# Patient Record
Sex: MALE | URBAN - METROPOLITAN AREA
[De-identification: names, ages, dates, MRNs, and addresses within clinical notes are randomized per-mention and may not be internally consistent; named-entity substitution may affect disease eponyms.]

---

## 2024-11-22 ENCOUNTER — LAB VISIT (OUTPATIENT)
Dept: LAB | Facility: HOSPITAL | Age: 54
End: 2024-11-22
Payer: COMMERCIAL

## 2024-11-22 ENCOUNTER — OFFICE VISIT (OUTPATIENT)
Facility: CLINIC | Age: 54
End: 2024-11-22
Payer: COMMERCIAL

## 2024-11-22 VITALS
BODY MASS INDEX: 21.73 KG/M2 | OXYGEN SATURATION: 96 % | WEIGHT: 118.06 LBS | RESPIRATION RATE: 18 BRPM | HEIGHT: 62 IN | TEMPERATURE: 98 F | HEART RATE: 92 BPM | DIASTOLIC BLOOD PRESSURE: 60 MMHG | SYSTOLIC BLOOD PRESSURE: 100 MMHG

## 2024-11-22 DIAGNOSIS — Z00.00 ENCOUNTER FOR ANNUAL PHYSICAL EXAM: ICD-10-CM

## 2024-11-22 DIAGNOSIS — Z11.3 SCREENING FOR STD (SEXUALLY TRANSMITTED DISEASE): ICD-10-CM

## 2024-11-22 DIAGNOSIS — Z11.59 NEED FOR HEPATITIS C SCREENING TEST: ICD-10-CM

## 2024-11-22 DIAGNOSIS — R82.90 ABNORMAL URINE ODOR: ICD-10-CM

## 2024-11-22 DIAGNOSIS — Z00.00 ENCOUNTER FOR ANNUAL PHYSICAL EXAM: Primary | ICD-10-CM

## 2024-11-22 DIAGNOSIS — Z11.4 ENCOUNTER FOR SCREENING FOR HUMAN IMMUNODEFICIENCY VIRUS (HIV): ICD-10-CM

## 2024-11-22 DIAGNOSIS — Z12.11 ENCOUNTER FOR COLORECTAL CANCER SCREENING: ICD-10-CM

## 2024-11-22 DIAGNOSIS — Z12.12 ENCOUNTER FOR COLORECTAL CANCER SCREENING: ICD-10-CM

## 2024-11-22 DIAGNOSIS — R82.90 ABNORMAL URINE ODOR: Primary | ICD-10-CM

## 2024-11-22 LAB
ALBUMIN SERPL BCP-MCNC: 3.9 G/DL (ref 3.5–5.2)
ALP SERPL-CCNC: 78 U/L (ref 40–150)
ALT SERPL W/O P-5'-P-CCNC: 18 U/L (ref 10–44)
ANION GAP SERPL CALC-SCNC: 11 MMOL/L (ref 8–16)
AST SERPL-CCNC: 26 U/L (ref 10–40)
BASOPHILS # BLD AUTO: 0.06 K/UL (ref 0–0.2)
BASOPHILS NFR BLD: 0.8 % (ref 0–1.9)
BILIRUB SERPL-MCNC: 0.9 MG/DL (ref 0.1–1)
BILIRUB UR QL STRIP: NEGATIVE
BUN SERPL-MCNC: 11 MG/DL (ref 6–20)
CALCIUM SERPL-MCNC: 9.4 MG/DL (ref 8.7–10.5)
CHLORIDE SERPL-SCNC: 106 MMOL/L (ref 95–110)
CHOLEST SERPL-MCNC: 182 MG/DL (ref 120–199)
CHOLEST/HDLC SERPL: 2 {RATIO} (ref 2–5)
CLARITY UR: CLEAR
CO2 SERPL-SCNC: 24 MMOL/L (ref 23–29)
COLOR UR: YELLOW
CREAT SERPL-MCNC: 0.7 MG/DL (ref 0.5–1.4)
DIFFERENTIAL METHOD BLD: ABNORMAL
EOSINOPHIL # BLD AUTO: 0.7 K/UL (ref 0–0.5)
EOSINOPHIL NFR BLD: 9.1 % (ref 0–8)
ERYTHROCYTE [DISTWIDTH] IN BLOOD BY AUTOMATED COUNT: 11.7 % (ref 11.5–14.5)
EST. GFR  (NO RACE VARIABLE): >60 ML/MIN/1.73 M^2
GLUCOSE SERPL-MCNC: 77 MG/DL (ref 70–110)
GLUCOSE UR QL STRIP: NEGATIVE
HCT VFR BLD AUTO: 42.9 % (ref 40–54)
HDLC SERPL-MCNC: 93 MG/DL (ref 40–75)
HDLC SERPL: 51.1 % (ref 20–50)
HGB BLD-MCNC: 14.3 G/DL (ref 14–18)
HGB UR QL STRIP: NEGATIVE
IMM GRANULOCYTES # BLD AUTO: 0.03 K/UL (ref 0–0.04)
IMM GRANULOCYTES NFR BLD AUTO: 0.4 % (ref 0–0.5)
KETONES UR QL STRIP: ABNORMAL
LDLC SERPL CALC-MCNC: 74.8 MG/DL (ref 63–159)
LEUKOCYTE ESTERASE UR QL STRIP: NEGATIVE
LYMPHOCYTES # BLD AUTO: 1.6 K/UL (ref 1–4.8)
LYMPHOCYTES NFR BLD: 21.6 % (ref 18–48)
MCH RBC QN AUTO: 32.7 PG (ref 27–31)
MCHC RBC AUTO-ENTMCNC: 33.3 G/DL (ref 32–36)
MCV RBC AUTO: 98 FL (ref 82–98)
MONOCYTES # BLD AUTO: 0.6 K/UL (ref 0.3–1)
MONOCYTES NFR BLD: 7.6 % (ref 4–15)
NEUTROPHILS # BLD AUTO: 4.5 K/UL (ref 1.8–7.7)
NEUTROPHILS NFR BLD: 60.5 % (ref 38–73)
NITRITE UR QL STRIP: NEGATIVE
NONHDLC SERPL-MCNC: 89 MG/DL
NRBC BLD-RTO: 0 /100 WBC
PH UR STRIP: 6 [PH] (ref 5–8)
PLATELET # BLD AUTO: 253 K/UL (ref 150–450)
PMV BLD AUTO: 10.5 FL (ref 9.2–12.9)
POTASSIUM SERPL-SCNC: 4.1 MMOL/L (ref 3.5–5.1)
PROT SERPL-MCNC: 7.3 G/DL (ref 6–8.4)
PROT UR QL STRIP: NEGATIVE
RBC # BLD AUTO: 4.37 M/UL (ref 4.6–6.2)
SODIUM SERPL-SCNC: 141 MMOL/L (ref 136–145)
SP GR UR STRIP: 1.01 (ref 1–1.03)
TRIGL SERPL-MCNC: 71 MG/DL (ref 30–150)
TSH SERPL DL<=0.005 MIU/L-ACNC: 0.43 UIU/ML (ref 0.4–4)
URN SPEC COLLECT METH UR: ABNORMAL
UROBILINOGEN UR STRIP-ACNC: NEGATIVE EU/DL
WBC # BLD AUTO: 7.37 K/UL (ref 3.9–12.7)

## 2024-11-22 PROCEDURE — 87591 N.GONORRHOEAE DNA AMP PROB: CPT

## 2024-11-22 PROCEDURE — 99999 PR PBB SHADOW E&M-NEW PATIENT-LVL IV: CPT | Mod: PBBFAC,,,

## 2024-11-22 PROCEDURE — 80053 COMPREHEN METABOLIC PANEL: CPT

## 2024-11-22 PROCEDURE — 87661 TRICHOMONAS VAGINALIS AMPLIF: CPT

## 2024-11-22 PROCEDURE — 83036 HEMOGLOBIN GLYCOSYLATED A1C: CPT

## 2024-11-22 PROCEDURE — 81003 URINALYSIS AUTO W/O SCOPE: CPT

## 2024-11-22 PROCEDURE — 87340 HEPATITIS B SURFACE AG IA: CPT

## 2024-11-22 PROCEDURE — 84443 ASSAY THYROID STIM HORMONE: CPT

## 2024-11-22 PROCEDURE — 85025 COMPLETE CBC W/AUTO DIFF WBC: CPT

## 2024-11-22 PROCEDURE — 3044F HG A1C LEVEL LT 7.0%: CPT | Mod: CPTII,S$GLB,,

## 2024-11-22 PROCEDURE — 86593 SYPHILIS TEST NON-TREP QUANT: CPT

## 2024-11-22 PROCEDURE — 80061 LIPID PANEL: CPT

## 2024-11-22 PROCEDURE — 99203 OFFICE O/P NEW LOW 30 MIN: CPT | Mod: S$GLB,,,

## 2024-11-22 PROCEDURE — 1159F MED LIST DOCD IN RCRD: CPT | Mod: CPTII,S$GLB,,

## 2024-11-22 PROCEDURE — 86803 HEPATITIS C AB TEST: CPT

## 2024-11-22 PROCEDURE — 1160F RVW MEDS BY RX/DR IN RCRD: CPT | Mod: CPTII,S$GLB,,

## 2024-11-22 PROCEDURE — 36415 COLL VENOUS BLD VENIPUNCTURE: CPT

## 2024-11-22 PROCEDURE — 3078F DIAST BP <80 MM HG: CPT | Mod: CPTII,S$GLB,,

## 2024-11-22 PROCEDURE — 3008F BODY MASS INDEX DOCD: CPT | Mod: CPTII,S$GLB,,

## 2024-11-22 PROCEDURE — 87086 URINE CULTURE/COLONY COUNT: CPT

## 2024-11-22 PROCEDURE — 87389 HIV-1 AG W/HIV-1&-2 AB AG IA: CPT

## 2024-11-22 PROCEDURE — 3074F SYST BP LT 130 MM HG: CPT | Mod: CPTII,S$GLB,,

## 2024-11-22 RX ORDER — NITROFURANTOIN 25; 75 MG/1; MG/1
100 CAPSULE ORAL 2 TIMES DAILY
Qty: 14 CAPSULE | Refills: 0 | Status: SHIPPED | OUTPATIENT
Start: 2024-11-22 | End: 2024-11-29

## 2024-11-22 NOTE — PROGRESS NOTES
SUBJECTIVE     Chief Complaint   Patient presents with    Urinary Tract Infection     Pt states his urine is discomfort       HPI  Justice Elder is a 54 y.o. male with no pertinent medical diagnoses as listed in the medical history and problem list that presents for annual exam. Pt is new to me.       HPI    History of Present Illness    CHIEF COMPLAINT:  Patient presents today for concerns about a urinary infection.    URINARY SYMPTOMS:  He reports experiencing a strong smell in his urine for the past three weeks. He denies any pain or burning sensation when urinating. His urine color varies, sometimes appearing yellow and sometimes white. He denies any penile discharge. His wife had similar symptoms previously, which resolved without medical intervention.    MEDICAL HISTORY:  He denies any known allergies to antibiotics and is not currently taking any medications. He has a previous diagnosis of arthritis, for which he received injections. He has not yet undergone a colonoscopy. He confirms receiving the tetanus vaccine, mentioning about 3 or 4 vaccines from a Dr. Helm. His last dental visit was many years ago, during which he had a tooth extracted.    FAMILY HISTORY:  His mother  of respiratory issues at approximately 60 years of age, about 10 years ago.    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 He reports regular consumption of beer daily after work, stating it helps him relax. He has a dedicated space outside his home where he engages in this habit. He occasionally limits his intake to one beer per day, but typically consumes more. He reports no sexual relations for more than six months.    SLEEP:  He reports sleeping from approximately 9 PM to 5:30 AM.    WEIGHT:  He reports difficulty gaining weight. His weight has fluctuated between 118 and 126 lbs, with a  "one-time peak of 132 lbs. He denies any other concerns related to his weight.      ROS:  General: -fever, -chills, -fatigue, -weight gain, -weight loss  Eyes: -vision changes, -redness, -discharge  ENT: -ear pain, -nasal congestion, -sore throat  Cardiovascular: -chest pain, -palpitations, -lower extremity edema  Respiratory: -cough, -shortness of breath  Gastrointestinal: -abdominal pain, -nausea, -vomiting, -diarrhea, -constipation, -blood in stool  Genitourinary: -dysuria, -hematuria, +frequency, -urgency  Musculoskeletal: -joint pain, -muscle pain  Skin: -rash, -lesion  Neurological: -headache, -dizziness, -numbness, -tingling  Psychiatric: -anxiety, -depression, -sleep difficulty         PAST MEDICAL HISTORY:  History reviewed. No pertinent past medical history.    PAST SURGICAL HISTORY:  History reviewed. No pertinent surgical history.    SOCIAL HISTORY:  Social History     Socioeconomic History    Marital status: Unknown   Tobacco Use    Smoking status: Every Day     Types: Cigarettes     Passive exposure: Never    Smokeless tobacco: Never   Substance and Sexual Activity    Alcohol use: Yes    Drug use: Never    Sexual activity: Not Currently       FAMILY HISTORY:  No family history on file.    ALLERGIES AND MEDICATIONS: updated and reviewed.  Review of patient's allergies indicates:  No Known Allergies  Current Outpatient Medications   Medication Sig Dispense Refill    nitrofurantoin, macrocrystal-monohydrate, (MACROBID) 100 MG capsule Take 1 capsule (100 mg total) by mouth 2 (two) times daily. for 7 days 14 capsule 0     No current facility-administered medications for this visit.       ROS  Review of Systems  ***    OBJECTIVE     Physical Exam  Vitals:    11/22/24 1507   BP: 100/60   Pulse: 92   Resp: 18   Temp: 98 °F (36.7 °C)    Body mass index is 21.59 kg/m².  Weight: 53.5 kg (118 lb 0.9 oz)   Height: 5' 2" (157.5 cm)     Physical Exam  Physical Exam    General: No acute distress. Well-developed. " Well-nourished.  Eyes: EOMI. Sclerae anicteric.  HENT: Normocephalic. Atraumatic. Nares patent. Moist oral mucosa.  Ears: Bilateral TMs clear. Bilateral EACs clear.  Cardiovascular: Regular rate. Regular rhythm. No murmurs. No rubs. No gallops. Normal S1, S2. Normal heart sounds.  Respiratory: Normal respiratory effort. Clear to auscultation bilaterally. No rales. No rhonchi. No wheezing. Normal lung sounds.  Abdomen: Soft. Non-tender. Non-distended. Normoactive bowel sounds.  Musculoskeletal: No  obvious deformity.  Extremities: No lower extremity edema.  Neurological: Alert & oriented x3. No slurred speech. Normal gait.  Psychiatric: Normal mood. Normal affect. Good insight. Good judgment.  Skin: Warm. Dry. No rash.           ***    Health Maintenance         Date Due Completion Date    Colorectal Cancer Screening Never done ---    Influenza Vaccine (1) 06/30/2025 (Originally 9/1/2024) ---    TETANUS VACCINE 11/22/2025 (Originally 8/27/1989) ---    Shingles Vaccine (1 of 2) 11/22/2025 (Originally 8/27/2021) ---    COVID-19 Vaccine (1 - 2024-25 season) 11/22/2025 (Originally 9/1/2024) ---    Pneumococcal Vaccines (Age 0-64) (1 of 2 - PCV) 11/22/2025 (Originally 8/27/1977) ---    Lipid Panel 11/22/2029 11/22/2024    RSV Vaccine (Age 60+ and Pregnant patients) (1 - 1-dose 75+ series) 08/27/2046 ---              ASSESSMENT     54 y.o. male with     1. Encounter for annual physical exam    2. Need for hepatitis C screening test    3. Encounter for screening for human immunodeficiency virus (HIV)    4. Abnormal urine odor    5. Screening for STD (sexually transmitted disease)    6. Encounter for colorectal cancer screening        PLAN:     1. Encounter for annual physical exam  ***  - Hemoglobin A1C; Future  - Lipid Panel; Future  - TSH; Future  - Comprehensive Metabolic Panel; Future  - CBC Auto Differential; Future    2. Need for hepatitis C screening test  ***  - Hepatitis C Antibody; Future    3. Encounter for  screening for human immunodeficiency virus (HIV)  ***  - HIV 1/2 Ag/Ab (4th Gen); Future    4. Abnormal urine odor  ***  - Urinalysis  - CULTURE, URINE  - nitrofurantoin, macrocrystal-monohydrate, (MACROBID) 100 MG capsule; Take 1 capsule (100 mg total) by mouth 2 (two) times daily. for 7 days  Dispense: 14 capsule; Refill: 0  - C. trachomatis/N. gonorrhoeae by AMP DNA  - Trichomonas vaginalis, RNA, Qual, Urine; Future    5. Screening for STD (sexually transmitted disease)  ***  - C. trachomatis/N. gonorrhoeae by AMP DNA  - Trichomonas vaginalis, RNA, Qual, Urine; Future  - Hepatitis B Surface Antigen; Future  - Treponema Pallidium Antibodies IgG, IgM; Future    6. Encounter for colorectal cancer screening  ***  - Ambulatory referral/consult to Endo Procedure ; Future      Assessment & Plan    Evaluated patient with reported urinary odor for 3 weeks, no pain or burning  Considered possible STI given wife's recent treatment, though patient denies sexual activity for 6 months  Ordered comprehensive lab work to assess overall health status, including kidney and liver function, given patient's alcohol consumption  Recommend colonoscopy for colorectal cancer screening due to patient's age (54)  Considered thyroid function evaluation due to patient's low weight    GENERAL ADULT MEDICAL EXAMINATION:  - Explained colonoscopy procedure, including preparation process and sedation.  - Discussed alternative option of at-home stool testing for colon cancer screening.  - Ordered comprehensive blood panel including hemoglobin A1C, lipid panel, thyroid function tests, kidney and liver function tests, and complete blood count.  - Ordered STI panel (chlamydia, gonorrhea, trichomonas, syphilis).    URINARY TRACT INFECTION:  - Started antibiotic, to be taken daily for 7 days.  - Ordered urinalysis and urine culture.    ALCOHOL ABUSE:  - Explained how alcohol can reduce antibiotic effectiveness.  - Patient to reduce alcohol  consumption, particularly during antibiotic treatment.    UNDERWEIGHT:  - Educated on the importance of protein intake for weight gain and muscle building.  - Recommend increasing protein intake (chicken, meats, pork) to promote weight gain.  - Patient to decrease consumption of carbohydrates (rice, tortilla, yuca, potato, pasta).  - Recommend adding protein shakes to diet for weight gain.    OCULAR PROTECTION:  - Patient to wear safety glasses when working in construction to protect eyes.    FOLLOW-UP:  - Follow up in 4 weeks to review test results and discuss findings.  - Contact office if any questions or concerns arise before the follow-up visit.           Fransisca Ferrara, MSN, APRN, FNP-C  [facility]  11/22/2024 3:23 PM    This note was generated with the assistance of ambient listening technology. Verbal consent was obtained by the patient and accompanying visitor(s) for the recording of patient appointment to facilitate this note. I attest to having reviewed and edited the generated note for accuracy, though some syntax or spelling errors may persist. Please contact the author of this note for any clarification.        Follow up in about 4 weeks (around 12/20/2024).     patient appointment to facilitate this note. I attest to having reviewed and edited the generated note for accuracy, though some syntax or spelling errors may persist. Please contact the author of this note for any clarification.        Follow up in about 4 weeks (around 12/20/2024).

## 2024-11-23 LAB
ESTIMATED AVG GLUCOSE: 82 MG/DL (ref 68–131)
HBA1C MFR BLD: 4.5 % (ref 4–5.6)
HBV SURFACE AG SERPL QL IA: NORMAL
HCV AB SERPL QL IA: NORMAL
HIV 1+2 AB+HIV1 P24 AG SERPL QL IA: NORMAL
TREPONEMA PALLIDUM IGG+IGM AB [PRESENCE] IN SERUM OR PLASMA BY IMMUNOASSAY: NONREACTIVE

## 2024-11-24 LAB — BACTERIA UR CULT: NO GROWTH

## 2024-11-25 LAB
C TRACH DNA SPEC QL NAA+PROBE: NOT DETECTED
N GONORRHOEA DNA SPEC QL NAA+PROBE: NOT DETECTED
SPECIMEN SOURCE: NORMAL
T VAGINALIS RRNA SPEC QL NAA+PROBE: NEGATIVE

## 2024-11-27 ENCOUNTER — TELEPHONE (OUTPATIENT)
Dept: PRIMARY CARE CLINIC | Facility: CLINIC | Age: 54
End: 2024-11-27
Payer: COMMERCIAL

## 2024-11-27 ENCOUNTER — TELEPHONE (OUTPATIENT)
Dept: ENDOSCOPY | Facility: HOSPITAL | Age: 54
End: 2024-11-27
Payer: COMMERCIAL

## 2024-11-27 ENCOUNTER — PATIENT MESSAGE (OUTPATIENT)
Dept: ENDOSCOPY | Facility: HOSPITAL | Age: 54
End: 2024-11-27
Payer: COMMERCIAL

## 2024-11-27 NOTE — TELEPHONE ENCOUNTER
Received below message.  Telephoned pt to schedule colonoscopy with no answer.  Left voicemail message with direct contact number for pt to return call.  Refendo 11/22 from DAVID Espino.        ----- Message -----   From: Luana Saeed   Sent: 11/25/2024   2:27 PM CST   To: [facility] Endoscopy Schedulers   Subject: Appointment Access                               Please call pt. @[phone] (home)  to schedule an appt.     Referral in chart.     MARCELINA Saeed

## 2024-11-27 NOTE — TELEPHONE ENCOUNTER
----- Message from Med Assistant Grullon sent at 11/26/2024  8:47 AM CST -----  Contact: [phone]  See pt message  ----- Message -----  From: Dianna Espinal  Sent: 11/26/2024   8:40 AM CST  To: Josias [facility] Staff    .[email or web address]     Patient is calling for Medical Advice regarding:pt is calling in regards to test results , states he is unable to read them due to not being in [place], provider would like those results sent to him in [place] if possible     How long has patient had these symptoms:    Pharmacy name and phone#:    [facility]      Please call pt and advise

## 2024-11-27 NOTE — TELEPHONE ENCOUNTER
Received call back from patient regarding lab results. Discussed findings with patient. Patient notified that the majority of your results are either within or close to the desired range. No new medications or changes need to be made at this time. Addressed and answered all of patient's questions and concerns. Patient verbalized understanding.

## 2024-11-27 NOTE — TELEPHONE ENCOUNTER
Second attempt to contact patient regarding lab results. No answer to available phone number. Left VM with call back information.

## 2024-11-27 NOTE — TELEPHONE ENCOUNTER
Attempted to contact patient regarding recent test results. No answer to available phone number. Left VM with call back information.

## 2024-11-27 NOTE — TELEPHONE ENCOUNTER
----- Message from Med Assistant Grullon sent at 11/26/2024  8:47 AM CST -----  Contact: [phone]  See pt message  ----- Message -----  From: Dianna Espinal  Sent: 11/26/2024   8:40 AM CST  To: [facility] Staff    .[email or web address]     Patient is calling for Medical Advice regarding:pt is calling in regards to test results , states he is unable to read them due to not being in [place], provider would like those results sent to him in Korean if possible     How long has patient had these symptoms:    Pharmacy name and phone#:    [facility]      Please call pt and advise

## 2024-12-02 ENCOUNTER — TELEPHONE (OUTPATIENT)
Dept: ENDOSCOPY | Facility: HOSPITAL | Age: 54
End: 2024-12-02
Payer: COMMERCIAL

## 2024-12-02 NOTE — TELEPHONE ENCOUNTER
Received below message.  Telephoned pt to schedule colonoscopy with no answer.  Left voicemail message with direct contact number for pt to return call.  Refendo 11/22 from DAVID Espino.           ----- Message -----   From: Luana Saeed   Sent: 11/25/2024   2:27 PM CST   To: [facility] Endoscopy Schedulers   Subject: Appointment Access                               Please call pt. @[phone] (home)  to schedule an appt.     Referral in chart.     MARCELINA Saeed

## 2024-12-30 ENCOUNTER — OFFICE VISIT (OUTPATIENT)
Facility: CLINIC | Age: 54
End: 2024-12-30
Payer: COMMERCIAL

## 2024-12-30 VITALS
HEIGHT: 62 IN | WEIGHT: 120.13 LBS | SYSTOLIC BLOOD PRESSURE: 122 MMHG | BODY MASS INDEX: 22.11 KG/M2 | OXYGEN SATURATION: 97 % | TEMPERATURE: 98 F | DIASTOLIC BLOOD PRESSURE: 70 MMHG | HEART RATE: 94 BPM | RESPIRATION RATE: 18 BRPM

## 2024-12-30 DIAGNOSIS — R68.82 LOW LIBIDO: ICD-10-CM

## 2024-12-30 DIAGNOSIS — Z71.2 ENCOUNTER TO DISCUSS TEST RESULTS: Primary | ICD-10-CM

## 2024-12-30 PROCEDURE — 1159F MED LIST DOCD IN RCRD: CPT | Mod: CPTII,S$GLB,,

## 2024-12-30 PROCEDURE — 1160F RVW MEDS BY RX/DR IN RCRD: CPT | Mod: CPTII,S$GLB,,

## 2024-12-30 PROCEDURE — 3078F DIAST BP <80 MM HG: CPT | Mod: CPTII,S$GLB,,

## 2024-12-30 PROCEDURE — 99214 OFFICE O/P EST MOD 30 MIN: CPT | Mod: S$GLB,,,

## 2024-12-30 PROCEDURE — 99999 PR PBB SHADOW E&M-EST. PATIENT-LVL IV: CPT | Mod: PBBFAC,,,

## 2024-12-30 PROCEDURE — 3008F BODY MASS INDEX DOCD: CPT | Mod: CPTII,S$GLB,,

## 2024-12-30 PROCEDURE — 3074F SYST BP LT 130 MM HG: CPT | Mod: CPTII,S$GLB,,

## 2024-12-30 RX ORDER — SILDENAFIL 25 MG/1
25 TABLET, FILM COATED ORAL DAILY PRN
Qty: 30 TABLET | Refills: 0 | Status: SHIPPED | OUTPATIENT
Start: 2024-12-30 | End: 2025-01-29

## 2024-12-30 NOTE — PROGRESS NOTES
SUBJECTIVE     Chief Complaint   Patient presents with    Follow-up     Pt is coming in for a f/u of urine discoloration        HPI  Justice Elder is a 54 y.o. male with no significant past medical history that presents for follow-up    HPI     History of Present Illness    CHIEF COMPLAINT:  Patient presents today for follow-up regarding lab results.    HEMATOLOGIC:  He has low RBC count on recent testing. He is not currently taking iron supplements.    MEDICATIONS:  He is not currently using any medications. He discontinued hydrocodone due to concerns about dependency.    GENITOURINARY:  He has no sexually transmitted diseases detected.    SOCIAL HISTORY:  He reports fatigue and stress from work.    ROS:  General: -fever, -chills, -fatigue, -weight gain, -weight loss  Eyes: -vision changes, -redness, -discharge  ENT: -ear pain, -nasal congestion, -sore throat  Cardiovascular: -chest pain, -palpitations, -lower extremity edema  Respiratory: -cough, -shortness of breath  Gastrointestinal: -abdominal pain, -nausea, -vomiting, -diarrhea, -constipation, -blood in stool, -change in bowel habits  Genitourinary: -dysuria, -hematuria, -frequency  Musculoskeletal: -joint pain, -muscle pain  Skin: -rash, -lesion  Neurological: -headache, -dizziness, -numbness, -tingling  Psychiatric: -anxiety, -depression, -sleep difficulty         PAST MEDICAL HISTORY:  History reviewed. No pertinent past medical history.    ALLERGIES AND MEDICATIONS: updated and reviewed.  Review of patient's allergies indicates:  No Known Allergies  Current Outpatient Medications   Medication Sig Dispense Refill    sildenafiL (VIAGRA) 25 MG tablet Take 1 tablet (25 mg total) by mouth daily as needed for Erectile Dysfunction. 30 tablet 0     No current facility-administered medications for this visit.       OBJECTIVE     Physical Exam  Vitals:    12/30/24 1604   BP: 122/70   Pulse: 94   Resp: 18   Temp: 98 °F (36.7 °C)    Body mass index is 21.98  "kg/m².  Weight: 54.5 kg (120 lb 2.4 oz)   Height: 5' 2" (157.5 cm)     Physical Exam  Vitals reviewed.   Constitutional:       General: He is not in acute distress.  HENT:      Right Ear: External ear normal.      Left Ear: External ear normal.      Nose: Nose normal.      Mouth/Throat:      Mouth: Mucous membranes are moist.   Eyes:      Extraocular Movements: Extraocular movements intact.      Conjunctiva/sclera: Conjunctivae normal.      Pupils: Pupils are equal, round, and reactive to light.   Cardiovascular:      Rate and Rhythm: Normal rate and regular rhythm.      Pulses: Normal pulses.      Heart sounds: Normal heart sounds.   Pulmonary:      Effort: Pulmonary effort is normal.      Breath sounds: Normal breath sounds.   Abdominal:      General: Bowel sounds are normal. There is no distension.      Palpations: Abdomen is soft.      Tenderness: There is no right CVA tenderness or left CVA tenderness.   Musculoskeletal:         General: No swelling. Normal range of motion.      Cervical back: Normal range of motion.   Skin:     General: Skin is warm and dry.      Findings: No rash.   Neurological:      General: No focal deficit present.      Mental Status: He is alert and oriented to person, place, and time.   Psychiatric:         Mood and Affect: Mood normal.         Behavior: Behavior normal.         Thought Content: Thought content normal.         Judgment: Judgment normal.       Health Maintenance         Date Due Completion Date    Pneumococcal Vaccines (Age 50+) (1 of 2 - PCV) Never done ---    Colorectal Cancer Screening Never done ---    Influenza Vaccine (1) 06/30/2025 (Originally 9/1/2024) ---    TETANUS VACCINE 11/22/2025 (Originally 8/27/1989) ---    Shingles Vaccine (1 of 2) 11/22/2025 (Originally 8/27/2021) ---    COVID-19 Vaccine (3 - 2024-25 season) 11/22/2025 (Originally 9/1/2024) 2/5/2022    Lipid Panel 11/22/2029 11/22/2024    RSV Vaccine (Age 60+ and Pregnant patients) (1 - 1-dose 75+ " series) 08/27/2046 ---          ASSESSMENT     54 y.o. male with     1. Encounter to discuss test results    2. Low libido      PLAN:     1. Encounter to discuss test results  - Discussed recent lab results  - All questions/concerns addressed  - Pt voiced understanding     2. Low libido  - TESTOSTERONE PANEL; Future  - sildenafiL (VIAGRA) 25 MG tablet; Take 1 tablet (25 mg total) by mouth daily as needed for Erectile Dysfunction.  Dispense: 30 tablet; Refill: 0    Assessment & Plan    IMPRESSION:  - Reviewed urine culture results: no growth, no sexually transmitted diseases detected  - Assessed patient's concerns about erectile dysfunction at age 54  - Considered testosterone level check to evaluate libido issues  - Will prescribe Viagra 25mg for erectile dysfunction, starting with low dose to assess efficacy and tolerability    ERECTILE DYSFUNCTION:  - Started Viagra 25mg as needed for erectile dysfunction.  - Take 1 daily if needed.  - Can increase to 50mg (two 25mg tablets) if 25mg is ineffective.  - Do not exceed 50mg without consulting the provider.  - Use caution and discontinue if experiencing palpitations or other heart-related symptoms.  - Educated on potential side effects of Viagra, including cardiovascular risks.  - Follow up in 4 weeks to assess effectiveness of Viagra.    PREDIABETES:  - Recommend taking multivitamin supplements (Centrum or [employer]).  - Recommend increasing iron intake to address slightly low RBC count.    URINARY TRACT INFECTION PREVENTION:  - Explained the mechanism of urinary tract infections, emphasizing the role of bacteria transfer during sexual activities.  - Discussed proper hygiene practices to prevent urinary tract infections in sexual partners.  - Patient to use antiseptic solution (Hibiclens foam) for genital hygiene after sexual intercourse.  - Patient to practice proper cleaning techniques for penis, including retracting foreskin.    HORMONE TESTING:  - Provided  information on the importance of testosterone testing in the morning for accurate results.  - Testosterone level test ordered to be conducted in the morning at [facility] lab.    FOLLOW-UP:  - Follow up on January 10th at 8:00 AM for testosterone level test.  - Follow up in 6 months for routine appointment if no issues arise.         DAVID Ravi  [facility]  12/30/2024 4:14 PM    I spent a total of 30 minutes on the day of the visit.This includes face to face time and non-face to face time preparing to see the patient (eg, review of tests), obtaining and/or reviewing separately obtained history, documenting clinical information in the electronic or other health record, independently interpreting results and communicating results to the patient/family/caregiver, or care coordinator.     Follow up in about 6 months (around 6/30/2025), or if symptoms worsen or fail to improve.    This note was generated with the assistance of ambient listening technology. Verbal consent was obtained by the patient and accompanying visitor(s) for the recording of patient appointment to facilitate this note. I attest to having reviewed and edited the generated note for accuracy, though some syntax or spelling errors may persist. Please contact the author of this note for any clarification.